# Patient Record
Sex: FEMALE | Race: WHITE
[De-identification: names, ages, dates, MRNs, and addresses within clinical notes are randomized per-mention and may not be internally consistent; named-entity substitution may affect disease eponyms.]

---

## 2020-03-10 ENCOUNTER — HOSPITAL ENCOUNTER (EMERGENCY)
Dept: HOSPITAL 46 - ED | Age: 30
Discharge: HOME | End: 2020-03-10
Payer: COMMERCIAL

## 2020-03-10 VITALS — BODY MASS INDEX: 21.38 KG/M2 | HEIGHT: 66 IN | WEIGHT: 133 LBS

## 2020-03-10 DIAGNOSIS — R00.2: Primary | ICD-10-CM

## 2020-03-10 DIAGNOSIS — R00.0: ICD-10-CM

## 2020-03-10 DIAGNOSIS — Z88.0: ICD-10-CM

## 2020-03-10 NOTE — EKG
[facility] [place] [facility]
                                    [address]
                                  [place], [place]  [address]
_________________________________________________________________________________________
                                                                 Signed   
 
 
Sinus tachycardia
Rightward axis
Borderline ECG
No previous ECGs available
Confirmed by AIMEE ABDI MD ([id number] on 3/10/2020 10:36:43 PM
 
 
 
 
 
 
 
 
 
 
 
 
 
 
 
 
 
 
 
 
 
 
 
 
 
 
 
 
 
 
 
 
 
 
 
 
 
 
 
 
    Electronically Signed By: AIMEE ABDI MD  03/10/20 [id number]
_________________________________________________________________________________________
PATIENT NAME:     REBECA REED                      
MEDICAL RECORD #: [id number]                     Electrocardiogram             
          ACCT #: [id number]  
DATE OF BIRTH:   [birth year]                                       
PHYSICIAN:   AIMEE ABDI MD                   REPORT #: [id number]
REPORT IS CONFIDENTIAL AND NOT TO BE RELEASED WITHOUT AUTHORIZATION